# Patient Record
Sex: FEMALE | URBAN - METROPOLITAN AREA
[De-identification: names, ages, dates, MRNs, and addresses within clinical notes are randomized per-mention and may not be internally consistent; named-entity substitution may affect disease eponyms.]

---

## 2018-01-23 ENCOUNTER — APPOINTMENT (RX ONLY)
Dept: URBAN - METROPOLITAN AREA CLINIC 90 | Facility: CLINIC | Age: 68
Setting detail: DERMATOLOGY
End: 2018-01-23

## 2018-01-23 DIAGNOSIS — L81.4 OTHER MELANIN HYPERPIGMENTATION: ICD-10-CM | Status: STABLE

## 2018-01-23 PROBLEM — D48.5 NEOPLASM OF UNCERTAIN BEHAVIOR OF SKIN: Status: ACTIVE | Noted: 2018-01-23

## 2018-01-23 PROBLEM — I10 ESSENTIAL (PRIMARY) HYPERTENSION: Status: ACTIVE | Noted: 2018-01-23

## 2018-01-23 PROCEDURE — ? SUNSCREEN RECOMMENDATIONS

## 2018-01-23 PROCEDURE — 11100: CPT

## 2018-01-23 PROCEDURE — 99202 OFFICE O/P NEW SF 15 MIN: CPT | Mod: 25

## 2018-01-23 PROCEDURE — ? BIOPSY BY SHAVE METHOD

## 2018-01-23 PROCEDURE — ? OBSERVATION

## 2018-01-23 ASSESSMENT — LOCATION DETAILED DESCRIPTION DERM
LOCATION DETAILED: LEFT PROXIMAL DORSAL FOREARM
LOCATION DETAILED: RIGHT PROXIMAL DORSAL FOREARM

## 2018-01-23 ASSESSMENT — LOCATION SIMPLE DESCRIPTION DERM
LOCATION SIMPLE: RIGHT FOREARM
LOCATION SIMPLE: LEFT FOREARM

## 2018-01-23 ASSESSMENT — LOCATION ZONE DERM: LOCATION ZONE: ARM

## 2018-01-23 NOTE — PROCEDURE: BIOPSY BY SHAVE METHOD
Post-Care Instructions: I reviewed with the patient in detail post-care instructions. Patient is to keep the biopsy site dry overnight, and then apply Neosporin twice daily until healed. Patient may apply hydrogen peroxide soaks to remove any crusting.  Pt to call office for any concerns with bx site.

## 2018-02-13 ENCOUNTER — APPOINTMENT (RX ONLY)
Dept: URBAN - METROPOLITAN AREA CLINIC 87 | Facility: CLINIC | Age: 68
Setting detail: DERMATOLOGY
End: 2018-02-13

## 2018-02-13 VITALS — DIASTOLIC BLOOD PRESSURE: 90 MMHG | SYSTOLIC BLOOD PRESSURE: 132 MMHG | HEART RATE: 73 BPM

## 2018-02-13 PROBLEM — C44.311 BASAL CELL CARCINOMA OF SKIN OF NOSE: Status: ACTIVE | Noted: 2018-02-13

## 2018-02-13 PROCEDURE — ? MOHS SURGERY

## 2018-02-13 PROCEDURE — 13152 CMPLX RPR E/N/E/L 2.6-7.5 CM: CPT

## 2018-02-13 PROCEDURE — 17311 MOHS 1 STAGE H/N/HF/G: CPT

## 2018-02-13 NOTE — PROCEDURE: MOHS SURGERY
Referred To Mid-Level For Closure Text (Leave Blank If You Do Not Want): After obtaining clear surgical margins the patient was sent to Jaun Packer PA-C for surgical repair.  The patient understands they will receive post-surgical care and follow-up from the mid-level provider.

## 2018-02-13 NOTE — PROCEDURE: MOHS SURGERY
Referred To Plastics For Closure Text (Leave Blank If You Do Not Want): After obtaining clear surgical margins the patient was sent to plastics for surgical repair.  The patient understands they will receive post-surgical care and follow-up from Dr. Esposito.

## 2019-07-26 NOTE — PROCEDURE: MOHS SURGERY
Medical Necessity Statement: Based on the clinical exam, the pathology, the patient's preference and my medical judgement, Mohs surgery is the most appropriate treatment for this cancer compared to other treatments. Home

## 2020-10-15 NOTE — PROCEDURE: MOHS SURGERY
Cathy Joseph comes in today for follow-up on hypertension and electrolyte imbalance.  She is taking over-the-counter magnesium citrate and that is working out good without causing any diarrhea.  No other complaints except for her little aches and pains from arthritis    ALLERGIES:   Allergen Reactions   • Amoxicillin RASH and SWELLING     Throat swelling and tongue swelling, also rash from dental procedure   • Morphine Hcl VOMITING and Other (See Comments)     \"I almost ., had to have a unit of blood after hip surgery in \"   • Penicillins RASH and ANAPHYLAXIS     rash   • Rofecoxib SHORTNESS OF BREATH     respitory   • Tetracycline Base RASH   • Adhesive   (Environmental) RASH     Tape bad but Tegaderm ok   • Dilaudid [Hydromorphone Hcl] VOMITING     IV form caused vomiting   • Iodine   (Environmental Or Med) Other (See Comments)     burning with contact tissue contact   • Levaquin HIVES     hives   • Sulfa Antibiotics RASH     \"burning rash\"   • Misc Natural Products Other (See Comments)     All perfumes and deodorants, only uses [employer]OVE   • Pollen Other (See Comments)     sneezing       Current Outpatient Medications   Medication Sig   • brimonidine (ALPHAGAN) 0.2 % ophthalmic solution USE 1 DROP(S) IN RIGHT EYE 2 TIMES A DAY   • Magnesium 100 MG Cap Take 100 mg by mouth daily.   • Acetaminophen (TYLENOL EXTRA STRENGTH PO)    • irbesartan (AVAPRO) 150 MG tablet Take 1 tablet by mouth daily.   • metOLazone (ZAROXOLYN) 2.5 MG tablet TAKE 1 TABLET BY MOUTH EVERY DAY   • Probiotic Product (FLORAJEN3) capsule    • latanoprost (XALATAN) 0.005 % ophthalmic solution Place 1 drop into both eyes nightly.    • omeprazole 20 MG tablet Take 40 mg by mouth daily.    • Calcium Carb-Cholecalciferol (CALTRATE 600+D) 600-800 MG-UNIT TABS Take 1 tablet by mouth daily.   • Meclizine HCl 25 MG OR TABS 1 TAB PO TID PRN     No current facility-administered medications for this visit.        PHYSICAL EXAMINATION:  VITAL SIGNS:     Visit Vitals  /74   Pulse 68   Temp 97.3 °F (36.3 °C)   Ht 4' 10.6\" (1.488 m)   Wt 53.4 kg   BMI 24.12 kg/m²     EARS, NOSE, THROAT:  Moist mucosa.  No evidence of thrush or erythema.  NECK:  No lymphadenopathy in the neck or supraclavicular area.  No neck stiffness.  CARDIOVASCULAR:  Regular rhythm.  S1, S2 normal.  No gallop or murmur.  LUNGS:  Clear to auscultation bilaterally.  ABDOMEN:  Soft, nontender, nondistended, no hepatosplenomegaly noted.  Good active bowel sounds.  LOWER EXTREMITIES:  No edema noted.  Good dorsalis pedis pulses palpable.    ASSESSMENT:  1. Essential hypertension    2. Electrolyte imbalance    3. Need for vaccination        PLAN:  Will get some labs on her today and give her the flu shot.  She will continue the same magnesium citrate over-the-counter    No follow-ups on file.  Orders Placed This Encounter   • Influenza Quadrivalent Enhanced High Dose Split Pres Free 0.7 mL Pre-filled Vacc (Fluzone High Dose Quad; ages 65+ yrs) - [id number]   • Comprehensive Metabolic Panel   • CBC with Automated Differential   • Magnesium   • brimonidine (ALPHAGAN) 0.2 % ophthalmic solution    visit     Quadrant Reporting?: no

## 2023-01-30 NOTE — HPI: SKIN LESIONS
How Severe Is Your Skin Lesion?: mild
Have Your Skin Lesions Been Treated?: not been treated
Is This A New Presentation, Or A Follow-Up?: Skin Lesions
Ketoconazole Counseling:   Patient counseled regarding improving absorption with orange juice.  Adverse effects include but are not limited to breast enlargement, headache, diarrhea, nausea, upset stomach, liver function test abnormalities, taste disturbance, and stomach pain.  There is a rare possibility of liver failure that can occur when taking ketoconazole. The patient understands that monitoring of LFTs may be required, especially at baseline. The patient verbalized understanding of the proper use and possible adverse effects of ketoconazole.  All of the patient's questions and concerns were addressed.